# Patient Record
Sex: MALE | Race: WHITE | NOT HISPANIC OR LATINO | Employment: OTHER | URBAN - METROPOLITAN AREA
[De-identification: names, ages, dates, MRNs, and addresses within clinical notes are randomized per-mention and may not be internally consistent; named-entity substitution may affect disease eponyms.]

---

## 2024-06-23 ENCOUNTER — HOSPITAL ENCOUNTER (EMERGENCY)
Facility: OTHER | Age: 79
Discharge: HOME | End: 2024-06-24
Attending: EMERGENCY MEDICINE
Payer: COMMERCIAL

## 2024-06-23 DIAGNOSIS — S00.83XA CONTUSION OF FACE, INITIAL ENCOUNTER: ICD-10-CM

## 2024-06-23 DIAGNOSIS — S43.402A SPRAIN OF LEFT SHOULDER, UNSPECIFIED SHOULDER SPRAIN TYPE, INITIAL ENCOUNTER: Primary | ICD-10-CM

## 2024-06-23 PROCEDURE — 25000003 PHARM REV CODE 250: Performed by: EMERGENCY MEDICINE

## 2024-06-23 PROCEDURE — 99284 EMERGENCY DEPT VISIT MOD MDM: CPT | Mod: 25

## 2024-06-23 RX ORDER — METOPROLOL SUCCINATE 50 MG/1
50 TABLET, EXTENDED RELEASE ORAL
COMMUNITY

## 2024-06-23 RX ORDER — APIXABAN 2.5 MG/1
2.5 TABLET, FILM COATED ORAL 2 TIMES DAILY
COMMUNITY
Start: 2024-06-19

## 2024-06-23 RX ORDER — ROSUVASTATIN CALCIUM 10 MG/1
10 TABLET, COATED ORAL NIGHTLY
COMMUNITY
Start: 2024-04-03

## 2024-06-23 RX ORDER — HYDROCODONE BITARTRATE AND ACETAMINOPHEN 5; 325 MG/1; MG/1
1 TABLET ORAL
Status: COMPLETED | OUTPATIENT
Start: 2024-06-23 | End: 2024-06-23

## 2024-06-23 RX ORDER — AMLODIPINE AND BENAZEPRIL HYDROCHLORIDE 5; 20 MG/1; MG/1
1 CAPSULE ORAL
COMMUNITY

## 2024-06-23 RX ORDER — ALLOPURINOL 300 MG/1
300 TABLET ORAL
COMMUNITY

## 2024-06-23 RX ORDER — EZETIMIBE 10 MG/1
10 TABLET ORAL
COMMUNITY

## 2024-06-23 RX ORDER — ZOLPIDEM TARTRATE 5 MG/1
5 TABLET ORAL NIGHTLY PRN
COMMUNITY
Start: 2024-06-21

## 2024-06-23 RX ADMIN — HYDROCODONE BITARTRATE AND ACETAMINOPHEN 1 TABLET: 5; 325 TABLET ORAL at 10:06

## 2024-06-24 VITALS
WEIGHT: 185 LBS | RESPIRATION RATE: 16 BRPM | DIASTOLIC BLOOD PRESSURE: 78 MMHG | SYSTOLIC BLOOD PRESSURE: 148 MMHG | HEIGHT: 68 IN | OXYGEN SATURATION: 97 % | BODY MASS INDEX: 28.04 KG/M2 | TEMPERATURE: 98 F | HEART RATE: 72 BPM

## 2024-06-24 RX ORDER — HYDROCODONE BITARTRATE AND ACETAMINOPHEN 5; 325 MG/1; MG/1
1 TABLET ORAL EVERY 4 HOURS PRN
Qty: 12 TABLET | Refills: 0 | Status: SHIPPED | OUTPATIENT
Start: 2024-06-24

## 2024-06-24 NOTE — ED TRIAGE NOTES
Pt resting comfortably. ED workup in progress. Call light within reach. Safety measures in place. Denies further needs. Plan of care ongoing.      Chief Complaint   Patient presents with    Fall     Mechanical fall @ 1830. Takes Eliquis BID, did not take night dose yet. Denies LOC, HA, dizziness. Hematoma on L side of face/eyebrow. Reports L shoulder taking brunt of fall, pain to L shoulder. HX stroke, has watchman placed.

## 2024-06-24 NOTE — ED PROVIDER NOTES
Encounter Date: 6/23/2024       History     Chief Complaint   Patient presents with    Fall     Mechanical fall @ 1830. Takes Eliquis BID, did not take night dose yet. Denies LOC, HA, dizziness. Hematoma on L side of face/eyebrow. Reports L shoulder taking brunt of fall, pain to L shoulder. HX stroke, has watchman placed.      Seen by physician at 10:45PM:    Pt is a 80 y/o M who presents to ED after a fall.  [place] and fell on his L side, falling onto his L shoulder and L face about 4-5 hours ago.  He denies any LOC.  He denies any neck or back pain. He denies any dizziness or confusion. Pt is taking a blood thinner. He has been using ice with improvement of the swelling.  He now has limited ability to abduct his arm secondary to pain to his upper arm and shoulder.          Review of patient's allergies indicates:  No Known Allergies  No past medical history on file.  No past surgical history on file.  No family history on file.     Review of Systems   Constitutional:  Negative for chills and fever.   HENT:  Positive for facial swelling. Negative for congestion and rhinorrhea.    Respiratory:  Negative for chest tightness and shortness of breath.    Cardiovascular:  Negative for chest pain and palpitations.   Gastrointestinal:  Negative for abdominal pain, diarrhea, nausea and vomiting.   Genitourinary:  Negative for dysuria and flank pain.   Musculoskeletal:  Negative for back pain and neck pain.        L shoulder pain   Skin:  Negative for color change and wound.   Neurological:  Negative for dizziness and headaches.       Physical Exam     Initial Vitals [06/23/24 2224]   BP Pulse Resp Temp SpO2   (!) 166/74 76 17 97.9 °F (36.6 °C) 96 %      MAP       --         Physical Exam    Nursing note and vitals reviewed.  Constitutional: He appears well-developed and well-nourished.   HENT:   Head: Normocephalic and atraumatic.   Swelling and hematoma noted to the lateral edge  of his L eyebrow.     Eyes: Conjunctivae and EOM are normal. Pupils are equal, round, and reactive to light.   Neck: Neck supple.   Normal range of motion.  Cardiovascular:  Normal rate, regular rhythm and normal heart sounds.           Pulmonary/Chest: Breath sounds normal. No respiratory distress. He has no wheezes. He has no rales.   Abdominal: Abdomen is soft. Bowel sounds are normal. He exhibits no distension. There is no abdominal tenderness. There is no rebound.   Musculoskeletal:         General: Tenderness present. No edema. Normal range of motion.      Cervical back: Normal range of motion and neck supple.      Comments: No C/T/L midline tenderness.    Tenderness to the L posterior shoulder and L proximal humerus.  No overlying swelling or ecchymosis or erythema. Increased pain and limited ability to abduct LUE secondary to pain.       Neurological: He is alert and oriented to person, place, and time.   Skin: Skin is warm and dry. Capillary refill takes less than 2 seconds.         ED Course   Procedures  Labs Reviewed - No data to display       Imaging Results              X-Ray Shoulder 2 or More Views Left (Final result)  Result time 06/24/24 00:03:41      Final result by Ruby Begum MD (06/24/24 00:03:41)                   Impression:      No acute bony abnormality detected.      Electronically signed by: Ruby Begum  Date:    06/24/2024  Time:    00:03               Narrative:    EXAMINATION:  TWO OR MORE VIEWS OF THE LEFT SHOULDER    CLINICAL HISTORY:  shoulder pain;    TECHNIQUE:  Two or more views of the left shoulder    COMPARISON:  None.    FINDINGS:  Two or more views of the left shoulder demonstrate no acute fracture or dislocation.  The bones are diffusely osteopenic.  Cervical hardware is present.                                       CT Cervical Spine Without Contrast (Final result)  Result time 06/23/24 23:31:46      Final result by Ruby Begum MD (06/23/24 23:31:46)                    Impression:      No acute intracranial abnormality detected.  Left superolateral orbital contusion.  Remote right cerebellar hemisphere infarct.    No acute cervical fracture.  Spondylitic changes.  Straightening of the normal cervical lordosis.      Electronically signed by: Ruby Begum  Date:    06/23/2024  Time:    23:31               Narrative:    EXAMINATION:  CT OF THE HEAD WITHOUT AND CT CERVICAL SPINE    CLINICAL HISTORY:  Head trauma, minor (Age >= 65y);; Neck trauma (Age >= 65y);    TECHNIQUE:  5 mm unenhanced axial images were obtained from the skull base to the vertex.  1.25 mm axial images were obtained through the cervical spine.    COMPARISON:  None.    FINDINGS:  CT head: Left superolateral orbital contusion is present.  Moderate cerebral atrophy and chronic small vessel ischemic changes are present.  There is no acute intracranial hemorrhage, territorial infarct or mass effect, or midline shift.  There is a nonacute right cerebellar hemisphere infarct.  The visualized paranasal sinuses and mastoid air cells are clear.    CT cervical spine: Surgical hardware is seen from C4-T1.  Laminectomy changes are seen from C4 through C7.  There is straightening of the normal cervical lordosis..  There is no acute fracture.  Intervertebral disc space narrowing is greatest at C3/C4.  There is grade 1 anterolisthesis of C3 on C4.  There is also significant intervertebral space narrowing at C7-T1.                                       CT Head Without Contrast (Final result)  Result time 06/23/24 23:31:46      Final result by Ruby Begum MD (06/23/24 23:31:46)                   Impression:      No acute intracranial abnormality detected.  Left superolateral orbital contusion.  Remote right cerebellar hemisphere infarct.    No acute cervical fracture.  Spondylitic changes.  Straightening of the normal cervical lordosis.      Electronically signed by: Ruby  Kel  Date:    06/23/2024  Time:    23:31               Narrative:    EXAMINATION:  CT OF THE HEAD WITHOUT AND CT CERVICAL SPINE    CLINICAL HISTORY:  Head trauma, minor (Age >= 65y);; Neck trauma (Age >= 65y);    TECHNIQUE:  5 mm unenhanced axial images were obtained from the skull base to the vertex.  1.25 mm axial images were obtained through the cervical spine.    COMPARISON:  None.    FINDINGS:  CT head: Left superolateral orbital contusion is present.  Moderate cerebral atrophy and chronic small vessel ischemic changes are present.  There is no acute intracranial hemorrhage, territorial infarct or mass effect, or midline shift.  There is a nonacute right cerebellar hemisphere infarct.  The visualized paranasal sinuses and mastoid air cells are clear.    CT cervical spine: Surgical hardware is seen from C4-T1.  Laminectomy changes are seen from C4 through C7.  There is straightening of the normal cervical lordosis..  There is no acute fracture.  Intervertebral disc space narrowing is greatest at C3/C4.  There is grade 1 anterolisthesis of C3 on C4.  There is also significant intervertebral space narrowing at C7-T1.                                    X-Rays:   Independently Interpreted Readings:   Other Readings:  Shoulder XR: No acute fracture or dislocation    Medications   HYDROcodone-acetaminophen 5-325 mg per tablet 1 tablet (1 tablet Oral Given 6/23/24 3092)     Medical Decision Making  10:45PM:  Patient is a 79-year-old male who presents to the emergency department after a fall, with a resultant left shoulder injury and left facial swelling.  He does take blood thinners.  Will plan for imaging, analgesia, will continue to follow and reassess.    Amount and/or Complexity of Data Reviewed  External Data Reviewed: notes.  Radiology: ordered and independent interpretation performed. Decision-making details documented in ED Course.    Risk  Prescription drug management.    12:23 AM:  Patient's CT and  x-rays are negative for any acute findings.  Patient likely has a shoulder sprain along with a facial contusion.  Will plan for a sling to use as needed and a prescription for pain medication to take as needed.  I do not feel that further work up in the ED is indicated at this time.  I updated pt regarding results and I counseled pt regarding supportive care measures.  I have discussed with the pt ED return warnings and need for close PCP f/u.  Pt agreeable to plan and all questions answered.  I feel that pt is stable for discharge and management as an outpatient and no further intervention is needed at this time.  Pt is comfortable returning to the ED if needed.  Will DC home in stable condition.                                    Clinical Impression:  Final diagnoses:  [S43.402A] Sprain of left shoulder, unspecified shoulder sprain type, initial encounter (Primary)  [S00.83XA] Contusion of face, initial encounter          ED Disposition Condition    Discharge Stable          ED Prescriptions       Medication Sig Dispense Start Date End Date Auth. Provider    HYDROcodone-acetaminophen (NORCO) 5-325 mg per tablet Take 1 tablet by mouth every 4 (four) hours as needed for Pain. 12 tablet 6/24/2024 -- Taty Botello MD          Follow-up Information    None          Taty Botello MD  06/24/24 0100

## 2024-06-24 NOTE — DISCHARGE INSTRUCTIONS
Use sling as needed. Make sure to rotate and range your shoulder periodically while using the sling.      We have prescribed you pain medication. Please fill and take as directed. Do not drink alcohol or drive while taking this medication.  Do not take any additional tylenol while taking this medication.  It is important to take stool softeners and/or eat plenty of fiber to prevent constipation while taking this medication.      Please return to the ER if you have chest pain, difficulty breathing, fevers, altered mental status, dizziness, weakness, or any other concerns.      Follow up with your primary care physician.